# Patient Record
Sex: FEMALE | Race: WHITE | NOT HISPANIC OR LATINO | URBAN - METROPOLITAN AREA
[De-identification: names, ages, dates, MRNs, and addresses within clinical notes are randomized per-mention and may not be internally consistent; named-entity substitution may affect disease eponyms.]

---

## 2018-09-28 ENCOUNTER — OFFICE (OUTPATIENT)
Dept: URBAN - METROPOLITAN AREA CLINIC 78 | Facility: CLINIC | Age: 48
End: 2018-09-28

## 2018-09-28 VITALS
DIASTOLIC BLOOD PRESSURE: 81 MMHG | HEART RATE: 94 BPM | TEMPERATURE: 98.8 F | HEIGHT: 65 IN | SYSTOLIC BLOOD PRESSURE: 110 MMHG | WEIGHT: 131 LBS

## 2018-09-28 DIAGNOSIS — R10.11 RIGHT UPPER QUADRANT PAIN: ICD-10-CM

## 2018-09-28 DIAGNOSIS — R11.0 NAUSEA: ICD-10-CM

## 2018-09-28 DIAGNOSIS — R63.4 ABNORMAL WEIGHT LOSS: ICD-10-CM

## 2018-09-28 PROCEDURE — 99204 OFFICE O/P NEW MOD 45 MIN: CPT

## 2018-09-28 RX ORDER — ONDANSETRON HYDROCHLORIDE 8 MG/1
TABLET, ORALLY DISINTEGRATING ORAL
Qty: 30 | Refills: 1 | Status: ACTIVE
Start: 2018-09-28

## 2018-09-28 RX ORDER — PANTOPRAZOLE SODIUM 20 MG/1
40 TABLET, DELAYED RELEASE ORAL
Qty: 60 | Refills: 1 | Status: ACTIVE
Start: 2018-09-28

## 2018-09-28 NOTE — SERVICEHPINOTES
MARÍA JIMÉNEZ   is a   48   year old    female who is being seen in consultation at the request of   DANDRE RAMIREZ   for RUQ pain with nausea. She was seen at [facility] where CT scan found bilateral non-obstructing renal calculi, otherwise normal elevated lipase 112 and normal CMP/CBC. She has not had abdominal U/S done. She reports RUQ onset 2 weeks ago that has become progressively worse, radiating to her back, along with right shoulder blade pain, described as "dull/sharp," occurs daily, lasting hours, worse after PO intake, improved by placing pressure over RUQ. She note associates symptoms include nausea that occurs immediately after PO intake of water or food, lasting hours, improved on empty stomach, and Zofran helps. She has tried Vicodin that helps the pain. No trial of PPI. She eats healthy, so no fatty foods. She has been using ibuprofen 2 pills every 4 to 6 hours for pain x 2 weeks that helps make the pain tolerable. She has lost 10 lbs over past month due to pain/nausea no change in appetite. Fm h/o gallbladder pathology (unsure type) in mother age of onset 40s. No prior EGD. No cardiovascular or pulmonary disease. Denies fevers, vomiting, cough, dysphagia, sour taste in mouth, reflux, regurgitation, melena, blood in stool, change in bowel habits.

## 2018-10-26 ENCOUNTER — OFFICE (OUTPATIENT)
Dept: URBAN - METROPOLITAN AREA PATHOLOGY 17 | Facility: PATHOLOGY | Age: 48
End: 2018-10-26
Payer: COMMERCIAL

## 2018-10-26 ENCOUNTER — OFFICE (OUTPATIENT)
Dept: URBAN - METROPOLITAN AREA CLINIC 32 | Facility: CLINIC | Age: 48
End: 2018-10-26

## 2018-10-26 VITALS
OXYGEN SATURATION: 99 % | RESPIRATION RATE: 16 BRPM | DIASTOLIC BLOOD PRESSURE: 93 MMHG | OXYGEN SATURATION: 95 % | WEIGHT: 131 LBS | SYSTOLIC BLOOD PRESSURE: 126 MMHG | HEIGHT: 65 IN | DIASTOLIC BLOOD PRESSURE: 80 MMHG | HEART RATE: 89 BPM | RESPIRATION RATE: 15 BRPM | HEART RATE: 88 BPM | RESPIRATION RATE: 14 BRPM | DIASTOLIC BLOOD PRESSURE: 65 MMHG | TEMPERATURE: 99.1 F | HEART RATE: 96 BPM | DIASTOLIC BLOOD PRESSURE: 66 MMHG | HEART RATE: 101 BPM | OXYGEN SATURATION: 97 % | SYSTOLIC BLOOD PRESSURE: 108 MMHG | SYSTOLIC BLOOD PRESSURE: 124 MMHG | SYSTOLIC BLOOD PRESSURE: 113 MMHG | TEMPERATURE: 99.3 F | OXYGEN SATURATION: 100 %

## 2018-10-26 DIAGNOSIS — R10.11 RIGHT UPPER QUADRANT PAIN: ICD-10-CM

## 2018-10-26 DIAGNOSIS — K29.60 OTHER GASTRITIS WITHOUT BLEEDING: ICD-10-CM

## 2018-10-26 DIAGNOSIS — R11.0 NAUSEA: ICD-10-CM

## 2018-10-26 DIAGNOSIS — R63.4 ABNORMAL WEIGHT LOSS: ICD-10-CM

## 2018-10-26 PROBLEM — K31.89 OTHER DISEASES OF STOMACH AND DUODENUM: Status: ACTIVE | Noted: 2018-10-26

## 2018-10-26 PROBLEM — K29.70 GASTRITIS, UNSPECIFIED, WITHOUT BLEEDING: Status: ACTIVE | Noted: 2018-10-26

## 2018-10-26 PROCEDURE — 88305 TISSUE EXAM BY PATHOLOGIST: CPT

## 2018-10-26 PROCEDURE — 88342 IMHCHEM/IMCYTCHM 1ST ANTB: CPT

## 2018-10-26 PROCEDURE — 88313 SPECIAL STAINS GROUP 2: CPT

## 2018-10-26 PROCEDURE — 43239 EGD BIOPSY SINGLE/MULTIPLE: CPT
